# Patient Record
Sex: FEMALE | Race: WHITE | NOT HISPANIC OR LATINO
[De-identification: names, ages, dates, MRNs, and addresses within clinical notes are randomized per-mention and may not be internally consistent; named-entity substitution may affect disease eponyms.]

---

## 2023-08-08 ENCOUNTER — NON-APPOINTMENT (OUTPATIENT)
Age: 43
End: 2023-08-08

## 2023-08-08 ENCOUNTER — APPOINTMENT (OUTPATIENT)
Dept: OBGYN | Facility: CLINIC | Age: 43
End: 2023-08-08
Payer: COMMERCIAL

## 2023-08-08 VITALS
SYSTOLIC BLOOD PRESSURE: 125 MMHG | WEIGHT: 215 LBS | BODY MASS INDEX: 34.55 KG/M2 | OXYGEN SATURATION: 98 % | HEIGHT: 66 IN | DIASTOLIC BLOOD PRESSURE: 89 MMHG | HEART RATE: 92 BPM

## 2023-08-08 DIAGNOSIS — Z78.9 OTHER SPECIFIED HEALTH STATUS: ICD-10-CM

## 2023-08-08 DIAGNOSIS — B00.9 HERPESVIRAL INFECTION, UNSPECIFIED: ICD-10-CM

## 2023-08-08 DIAGNOSIS — Z87.42 PERSONAL HISTORY OF OTHER DISEASES OF THE FEMALE GENITAL TRACT: ICD-10-CM

## 2023-08-08 DIAGNOSIS — R87.810 CERVICAL HIGH RISK HUMAN PAPILLOMAVIRUS (HPV) DNA TEST POSITIVE: ICD-10-CM

## 2023-08-08 DIAGNOSIS — Z80.8 FAMILY HISTORY OF MALIGNANT NEOPLASM OF OTHER ORGANS OR SYSTEMS: ICD-10-CM

## 2023-08-08 DIAGNOSIS — Z01.419 ENCOUNTER FOR GYNECOLOGICAL EXAMINATION (GENERAL) (ROUTINE) W/OUT ABNORMAL FINDINGS: ICD-10-CM

## 2023-08-08 PROBLEM — Z00.00 ENCOUNTER FOR PREVENTIVE HEALTH EXAMINATION: Status: ACTIVE | Noted: 2023-08-08

## 2023-08-08 PROCEDURE — 99386 PREV VISIT NEW AGE 40-64: CPT

## 2023-08-08 RX ORDER — LISDEXAMFETAMINE DIMESYLATE 10 MG/1
10 TABLET, CHEWABLE ORAL
Refills: 0 | Status: ACTIVE | COMMUNITY

## 2023-08-08 RX ORDER — PROPRANOLOL HYDROCHLORIDE 10 MG/1
10 TABLET ORAL
Refills: 0 | Status: ACTIVE | COMMUNITY

## 2023-08-08 NOTE — HISTORY OF PRESENT ILLNESS
[Patient reported PAP Smear was normal] : Patient reported PAP Smear was normal [N] : Patient denies prior pregnancies [Normal Amount/Duration] :  normal amount and duration [Currently Active] : currently active [Men] : men [Patient reported mammogram was normal] : Patient reported mammogram was normal [PapSmeardate] : 2021 [No] : never pregnant [TextBox_6] : 7/26/23 [FreeTextEntry1] : 07/26/2023

## 2023-08-08 NOTE — PLAN
[FreeTextEntry1] : annual: pap and hpv  saw PMD yesterday, awaiting results, knows has slightly  high cholesterol in past, aware of high bmi got referral for mammogram yesterday from pmd, also did genetic tests with her Dr Joiner  did not like birth control, fine with withdrawal , d/w pt risk of failure does reg skin checks

## 2023-08-09 LAB — HPV HIGH+LOW RISK DNA PNL CVX: NOT DETECTED

## 2023-08-15 LAB — CYTOLOGY CVX/VAG DOC THIN PREP: ABNORMAL

## 2023-08-22 ENCOUNTER — TRANSCRIPTION ENCOUNTER (OUTPATIENT)
Age: 43
End: 2023-08-22

## 2023-10-01 PROBLEM — R87.810 CERVICAL HIGH RISK HUMAN PAPILLOMAVIRUS (HPV) DNA TEST POSITIVE: Status: RESOLVED | Noted: 2023-08-08 | Resolved: 2023-10-01
